# Patient Record
Sex: MALE | Race: WHITE | Employment: FULL TIME | URBAN - METROPOLITAN AREA
[De-identification: names, ages, dates, MRNs, and addresses within clinical notes are randomized per-mention and may not be internally consistent; named-entity substitution may affect disease eponyms.]

---

## 2018-04-19 ENCOUNTER — OFFICE VISIT (OUTPATIENT)
Dept: FAMILY MEDICINE CLINIC | Facility: CLINIC | Age: 43
End: 2018-04-19

## 2018-04-19 VITALS
HEART RATE: 100 BPM | WEIGHT: 245 LBS | TEMPERATURE: 98 F | RESPIRATION RATE: 16 BRPM | BODY MASS INDEX: 34.3 KG/M2 | SYSTOLIC BLOOD PRESSURE: 150 MMHG | HEIGHT: 71 IN | DIASTOLIC BLOOD PRESSURE: 88 MMHG

## 2018-04-19 DIAGNOSIS — L73.9 FOLLICULITIS: ICD-10-CM

## 2018-04-19 DIAGNOSIS — R03.0 ELEVATED BLOOD PRESSURE READING: ICD-10-CM

## 2018-04-19 DIAGNOSIS — N50.82 SCROTAL PAIN: Primary | ICD-10-CM

## 2018-04-19 PROCEDURE — 99203 OFFICE O/P NEW LOW 30 MIN: CPT | Performed by: FAMILY MEDICINE

## 2018-04-19 NOTE — PROGRESS NOTES
Ca Hoang is a 44year old male. Patient presents with:  Establish Care: possible MRSA on back, inflammation at vasectomy site-had done 2012 per pt      HPI:   He gets pimples on his back. They come and go.  Will get one as big as a nguyen that will  150/88   Pulse 100   Temp 98.4 °F (36.9 °C) (Temporal)   Resp 16   Ht 71\"   Wt 245 lb   BMI 34.17 kg/m²  Body mass index is 34.17 kg/m².       GENERAL: well developed, well nourished,in no apparent distress  SKIN: no rashes,no suspicious lesions, upper ese

## 2018-05-03 ENCOUNTER — TELEPHONE (OUTPATIENT)
Dept: FAMILY MEDICINE CLINIC | Facility: CLINIC | Age: 43
End: 2018-05-03

## 2018-05-03 NOTE — TELEPHONE ENCOUNTER
Pt is currently a pt of   But would like to switch to [facility]. He says it's bc [facility] see his wife.

## 2018-07-10 ENCOUNTER — TELEPHONE (OUTPATIENT)
Dept: FAMILY MEDICINE CLINIC | Facility: CLINIC | Age: 43
End: 2018-07-10

## 2018-07-10 ENCOUNTER — HOSPITAL ENCOUNTER (OUTPATIENT)
Dept: ULTRASOUND IMAGING | Age: 43
Discharge: HOME | End: 2018-07-10
Attending: FAMILY MEDICINE
Payer: COMMERCIAL

## 2018-07-10 DIAGNOSIS — N50.89 SCROTAL MASS: Primary | ICD-10-CM

## 2018-07-10 DIAGNOSIS — N50.82 SCROTAL PAIN: ICD-10-CM

## 2018-07-10 PROCEDURE — 93975 VASCULAR STUDY: CPT | Performed by: FAMILY MEDICINE

## 2018-07-10 PROCEDURE — 76870 US EXAM SCROTUM: CPT | Performed by: FAMILY MEDICINE

## 2018-07-10 NOTE — TELEPHONE ENCOUNTER
I called patient. and discussed US results with pt. I am not sure what to make of the mass, he does have hydrocele as well. Lets refer to Dr. Josue Oro for follow up.  These issues are chronic, so this is not an emergent referral.     RN: please fax US report

## 2018-07-10 NOTE — TELEPHONE ENCOUNTER
Spoke with the pt and advised of the notes from Dr. Deidra Baker and he v/u  He asks me what would be symptoms of prostate CA- I advised that he would have trouble urinating, be up multiple times through out the night and not able to urinate.  He might have pain wi

## 2018-07-10 NOTE — TELEPHONE ENCOUNTER
Pt would like to know if there is a blood test he can have done within the next 2 days that checks for prostate cancer. He leaves for work within the next 3 days for a month and would like to have it checked before.    Please return call to [phone]

## 2018-07-10 NOTE — TELEPHONE ENCOUNTER
Sorry, no, I've never met him, don't usually do prostate cancer test at 44, would need to discuss concerns/symptoms and then order testing as deemed appropriate

## 2018-08-30 ENCOUNTER — TELEPHONE (OUTPATIENT)
Dept: FAMILY MEDICINE CLINIC | Facility: CLINIC | Age: 43
End: 2018-08-30

## 2018-08-30 PROBLEM — L72.3 SCROTAL SEBACEOUS CYST: Status: ACTIVE | Noted: 2018-08-30

## 2018-08-30 NOTE — TELEPHONE ENCOUNTER
Pt called and asked for a copy of the ultra sound he recently had of his scrotum and a report to take to another dr today. Pt picked up disc and report and completed med records form. Made bar code to this note and sent med records form to scanning.

## 2019-01-23 ENCOUNTER — TELEPHONE (OUTPATIENT)
Dept: FAMILY MEDICINE CLINIC | Facility: CLINIC | Age: 44
End: 2019-01-23

## 2019-01-23 NOTE — TELEPHONE ENCOUNTER
Paperwork in blue book for the Department of [address] [place] [address]. Copy sent to scanning.

## 2019-02-13 ENCOUNTER — TELEPHONE (OUTPATIENT)
Dept: FAMILY MEDICINE CLINIC | Facility: CLINIC | Age: 44
End: 2019-02-13

## 2019-02-13 NOTE — TELEPHONE ENCOUNTER
Sure, can schedule CPX and if he has any test results from prior testing bring with him (if they did any labs)

## 2019-02-13 NOTE — TELEPHONE ENCOUNTER
Pt called, states he'd like a physical with fasting blood work but he [facility] past [facility] months and was wondering about fasting blood work. Please call pt at [phone] between noon and 1800 hours (6:00PM).   Okay

## 2019-02-13 NOTE — TELEPHONE ENCOUNTER
Spoke with the pt and scheduled his appt  Asked him to bring any labs he has had done - he v/u  Future Appointments   Date Time Provider Rosa Shrestha   3/8/2019  1:15 PM Birdie Leahy MD [facility]

## 2019-03-08 ENCOUNTER — OFFICE VISIT (OUTPATIENT)
Dept: FAMILY MEDICINE CLINIC | Facility: CLINIC | Age: 44
End: 2019-03-08
Payer: COMMERCIAL

## 2019-03-08 VITALS
BODY MASS INDEX: 37.52 KG/M2 | HEIGHT: 71 IN | TEMPERATURE: 98 F | OXYGEN SATURATION: 98 % | HEART RATE: 76 BPM | DIASTOLIC BLOOD PRESSURE: 80 MMHG | SYSTOLIC BLOOD PRESSURE: 118 MMHG | WEIGHT: 268 LBS

## 2019-03-08 DIAGNOSIS — Z13.29 THYROID DISORDER SCREEN: ICD-10-CM

## 2019-03-08 DIAGNOSIS — Z23 NEED FOR VACCINATION: ICD-10-CM

## 2019-03-08 DIAGNOSIS — Z13.220 LIPID SCREENING: ICD-10-CM

## 2019-03-08 DIAGNOSIS — M25.561 CHRONIC PAIN OF RIGHT KNEE: ICD-10-CM

## 2019-03-08 DIAGNOSIS — Z00.00 ROUTINE HISTORY AND PHYSICAL EXAMINATION OF ADULT: Primary | ICD-10-CM

## 2019-03-08 DIAGNOSIS — Z13.0 SCREENING, ANEMIA, DEFICIENCY, IRON: ICD-10-CM

## 2019-03-08 DIAGNOSIS — L05.91 PILONIDAL CYST: ICD-10-CM

## 2019-03-08 DIAGNOSIS — K42.9 UMBILICAL HERNIA WITHOUT OBSTRUCTION AND WITHOUT GANGRENE: ICD-10-CM

## 2019-03-08 DIAGNOSIS — G89.29 CHRONIC PAIN OF RIGHT KNEE: ICD-10-CM

## 2019-03-08 DIAGNOSIS — Z13.1 DIABETES MELLITUS SCREENING: ICD-10-CM

## 2019-03-08 PROBLEM — R03.0 ELEVATED BLOOD PRESSURE READING: Status: RESOLVED | Noted: 2018-04-19 | Resolved: 2019-03-08

## 2019-03-08 LAB
ALBUMIN SERPL-MCNC: 3.9 G/DL (ref 3.4–5)
ALBUMIN/GLOB SERPL: 1.1 {RATIO} (ref 1–2)
ALP LIVER SERPL-CCNC: 82 U/L (ref 45–117)
ALT SERPL-CCNC: 41 U/L (ref 16–61)
ANION GAP SERPL CALC-SCNC: 6 MMOL/L (ref 0–18)
AST SERPL-CCNC: 20 U/L (ref 15–37)
BASOPHILS # BLD AUTO: 0.05 X10(3) UL (ref 0–0.2)
BASOPHILS NFR BLD AUTO: 0.7 %
BILIRUB SERPL-MCNC: 0.2 MG/DL (ref 0.1–2)
BUN BLD-MCNC: 12 MG/DL (ref 7–18)
BUN/CREAT SERPL: 15 (ref 10–20)
CALCIUM BLD-MCNC: 9.6 MG/DL (ref 8.5–10.1)
CHLORIDE SERPL-SCNC: 107 MMOL/L (ref 98–107)
CHOLEST SMN-MCNC: 168 MG/DL (ref ?–200)
CO2 SERPL-SCNC: 26 MMOL/L (ref 21–32)
CREAT BLD-MCNC: 0.8 MG/DL (ref 0.7–1.3)
DEPRECATED RDW RBC AUTO: 42.4 FL (ref 35.1–46.3)
EOSINOPHIL # BLD AUTO: 0.19 X10(3) UL (ref 0–0.7)
EOSINOPHIL NFR BLD AUTO: 2.8 %
ERYTHROCYTE [DISTWIDTH] IN BLOOD BY AUTOMATED COUNT: 13.1 % (ref 11–15)
GLOBULIN PLAS-MCNC: 3.7 G/DL (ref 2.8–4.4)
GLUCOSE BLD-MCNC: 90 MG/DL (ref 70–99)
HCT VFR BLD AUTO: 49 % (ref 39–53)
HDLC SERPL-MCNC: 33 MG/DL (ref 40–59)
HGB BLD-MCNC: 16.8 G/DL (ref 13–17.5)
IMM GRANULOCYTES # BLD AUTO: 0.01 X10(3) UL (ref 0–1)
IMM GRANULOCYTES NFR BLD: 0.1 %
LDLC SERPL CALC-MCNC: 98 MG/DL (ref ?–100)
LYMPHOCYTES # BLD AUTO: 2.57 X10(3) UL (ref 1–4)
LYMPHOCYTES NFR BLD AUTO: 38.2 %
M PROTEIN MFR SERPL ELPH: 7.6 G/DL (ref 6.4–8.2)
MCH RBC QN AUTO: 30.4 PG (ref 26–34)
MCHC RBC AUTO-ENTMCNC: 34.3 G/DL (ref 31–37)
MCV RBC AUTO: 88.6 FL (ref 80–100)
MONOCYTES # BLD AUTO: 0.65 X10(3) UL (ref 0.1–1)
MONOCYTES NFR BLD AUTO: 9.7 %
NEUTROPHILS # BLD AUTO: 3.25 X10 (3) UL (ref 1.5–7.7)
NEUTROPHILS # BLD AUTO: 3.25 X10(3) UL (ref 1.5–7.7)
NEUTROPHILS NFR BLD AUTO: 48.5 %
NONHDLC SERPL-MCNC: 135 MG/DL (ref ?–130)
OSMOLALITY SERPL CALC.SUM OF ELEC: 287 MOSM/KG (ref 275–295)
PLATELET # BLD AUTO: 228 10(3)UL (ref 150–450)
POTASSIUM SERPL-SCNC: 4.4 MMOL/L (ref 3.5–5.1)
RBC # BLD AUTO: 5.53 X10(6)UL (ref 4.3–5.7)
SODIUM SERPL-SCNC: 139 MMOL/L (ref 136–145)
TRIGL SERPL-MCNC: 185 MG/DL (ref 30–149)
TSI SER-ACNC: 0.78 MIU/ML (ref 0.36–3.74)
VLDLC SERPL CALC-MCNC: 37 MG/DL (ref 0–30)
WBC # BLD AUTO: 6.7 X10(3) UL (ref 4–11)

## 2019-03-08 PROCEDURE — 83036 HEMOGLOBIN GLYCOSYLATED A1C: CPT | Performed by: FAMILY MEDICINE

## 2019-03-08 PROCEDURE — 90715 TDAP VACCINE 7 YRS/> IM: CPT | Performed by: FAMILY MEDICINE

## 2019-03-08 PROCEDURE — 99396 PREV VISIT EST AGE 40-64: CPT | Performed by: FAMILY MEDICINE

## 2019-03-08 PROCEDURE — 90472 IMMUNIZATION ADMIN EACH ADD: CPT | Performed by: FAMILY MEDICINE

## 2019-03-08 PROCEDURE — 90686 IIV4 VACC NO PRSV 0.5 ML IM: CPT | Performed by: FAMILY MEDICINE

## 2019-03-08 PROCEDURE — 36415 COLL VENOUS BLD VENIPUNCTURE: CPT | Performed by: FAMILY MEDICINE

## 2019-03-08 PROCEDURE — 80050 GENERAL HEALTH PANEL: CPT | Performed by: FAMILY MEDICINE

## 2019-03-08 PROCEDURE — 80061 LIPID PANEL: CPT | Performed by: FAMILY MEDICINE

## 2019-03-08 PROCEDURE — 90471 IMMUNIZATION ADMIN: CPT | Performed by: FAMILY MEDICINE
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
## 2019-03-09 LAB
EST. AVERAGE GLUCOSE BLD GHB EST-MCNC: 108 MG/DL (ref 68–126)
HBA1C MFR BLD HPLC: 5.4 % (ref ?–5.7)

## 2019-03-29 ENCOUNTER — TELEPHONE (OUTPATIENT)
Dept: FAMILY MEDICINE CLINIC | Facility: CLINIC | Age: 44
End: 2019-03-29

## 2019-03-29 RX ORDER — VARENICLINE TARTRATE 1 MG/1
1 TABLET, FILM COATED ORAL 2 TIMES DAILY
Qty: 1 PACKAGE | Refills: 4 | Status: ON HOLD | OUTPATIENT
Start: 2019-03-29 | End: 2019-05-30

## 2019-03-29 NOTE — TELEPHONE ENCOUNTER
I'm fie with that, agree with advice given, [place] has some free services to help as well:  Http://quityes. org/  1-866-QUIT-YES

## 2019-03-29 NOTE — TELEPHONE ENCOUNTER
Spoke with the pt and advised of the medication and the pharmcy and the options from the [address]  He v/u  He will call if any questions

## 2019-03-29 NOTE — TELEPHONE ENCOUNTER
Patient would like to speak with Dr Doris Duarte regarding Chantix. Is thinking about trying Chantix to quit smoking and has some questions regarding this medication.

## 2019-03-29 NOTE — TELEPHONE ENCOUNTER
Spoke with the pt and he is wanting to quit smoking- he is currently wearing a nicotine patch 21mg- he states that he is not smoking cigarettes but is smoking cigars- He is interested in trying the chantix- he states that the chantix is not allowed in his

## 2019-05-30 PROBLEM — F32.2 MAJOR DEPRESSIVE DISORDER, SINGLE EPISODE, SEVERE (HCC): Status: ACTIVE | Noted: 2019-05-30

## 2019-05-31 PROBLEM — F10.20 ALCOHOL USE DISORDER, SEVERE, DEPENDENCE (HCC): Status: ACTIVE | Noted: 2019-05-31

## 2019-06-11 ENCOUNTER — OFFICE VISIT (OUTPATIENT)
Dept: FAMILY MEDICINE CLINIC | Facility: CLINIC | Age: 44
End: 2019-06-11
Payer: COMMERCIAL

## 2019-06-11 VITALS
RESPIRATION RATE: 14 BRPM | BODY MASS INDEX: 37.43 KG/M2 | HEIGHT: 71 IN | TEMPERATURE: 98 F | SYSTOLIC BLOOD PRESSURE: 150 MMHG | DIASTOLIC BLOOD PRESSURE: 90 MMHG | HEART RATE: 88 BPM | WEIGHT: 267.38 LBS

## 2019-06-11 DIAGNOSIS — M79.604 PAIN OF RIGHT LOWER EXTREMITY: Primary | ICD-10-CM

## 2019-06-11 PROCEDURE — 99213 OFFICE O/P EST LOW 20 MIN: CPT | Performed by: FAMILY MEDICINE

## 2019-06-11 RX ORDER — NALTREXONE HYDROCHLORIDE 50 MG/1
50 TABLET, FILM COATED ORAL DAILY
COMMUNITY
End: 2020-06-01 | Stop reason: ALTCHOICE

## 2019-06-11 NOTE — PROGRESS NOTES
Tresa Cronin is a 38year old male. HPI:   Patient c/o ongoign r knee pain for over 6months, becoming more noticeable and frequent.   He works out regularly and it doesn't seem to bothers him when working out or bearing weight at all, just when lifting l SYSTEMS:   GENERAL HEALTH: feels well otherwise  SKIN: denies any unusual skin lesions or rashes  CARDIOVASCULAR: good color and temp to extremities  NEURO: denies numbness/tingling  MUSC: see HPI    EXAM:   /90   Pulse 88   Temp 98.2 °F (36.8 °C) (T

## 2019-06-18 ENCOUNTER — TELEPHONE (OUTPATIENT)
Dept: FAMILY MEDICINE CLINIC | Facility: CLINIC | Age: 44
End: 2019-06-18

## 2019-06-18 NOTE — TELEPHONE ENCOUNTER
Bunny Corbett from the [facility] department called to ask about the order. The order says (w) and (w/o) for right knee. Is that what [facility] really wants? They can do (w) but they normally don't.

## 2019-06-20 ENCOUNTER — HOSPITAL ENCOUNTER (OUTPATIENT)
Dept: MRI IMAGING | Age: 44
Discharge: HOME | End: 2019-06-20
Attending: FAMILY MEDICINE
Payer: COMMERCIAL

## 2019-06-20 DIAGNOSIS — M79.604 PAIN OF RIGHT LOWER EXTREMITY: ICD-10-CM

## 2019-06-20 PROCEDURE — A9575 INJ GADOTERATE MEGLUMI 0.1ML: HCPCS | Performed by: FAMILY MEDICINE

## 2019-06-20 PROCEDURE — 73723 MRI JOINT LWR EXTR W/O&W/DYE: CPT | Performed by: FAMILY MEDICINE

## 2019-06-24 ENCOUNTER — TELEPHONE (OUTPATIENT)
Dept: FAMILY MEDICINE CLINIC | Facility: CLINIC | Age: 44
End: 2019-06-24

## 2019-06-24 ENCOUNTER — PATIENT MESSAGE (OUTPATIENT)
Dept: FAMILY MEDICINE CLINIC | Facility: CLINIC | Age: 44
End: 2019-06-24

## 2019-06-24 NOTE — TELEPHONE ENCOUNTER
From: Nicci Diamond  To: Tatyana Ocampo MD  Sent: 6/24/2019 6:22 AM CDT  Subject: Test Results Question    Dr Ginger Simmons, I was curious if you had my mri results back? This is the craziest thing with this knee I tell ya.  I was squatting at the gym with heavy we

## 2019-06-24 NOTE — TELEPHONE ENCOUNTER
----- Message from Amanda Rosa MD sent at 6/24/2019  7:02 AM CDT -----  Please notify patient it turns out he has a tear of the meniscus that sits adjacent to the medial (inner) border of the knee cap.   This is likely the cause of all or the majority of

## 2019-06-24 NOTE — TELEPHONE ENCOUNTER
Patient advised. Verbalized understanding. Patient states he also sent a [employer] message this morning to Dr Ashleigh Steinberg. Wants to know if he can still exercise. States that when he does squats it doesn't hurt, but when he runs/walks it hurts.  Ok to send [address]

## 2019-06-24 NOTE — TELEPHONE ENCOUNTER
Pt called, saw the MRI results on [facility] and would like a call back to discuss.    Please call pt at [phone]

## 2019-06-24 NOTE — TELEPHONE ENCOUNTER
My rule of thumb here: if it hurst don't do it. If it doesn't hurt (or feel unstable, like knee will give out), go ahead.

## 2019-06-25 ENCOUNTER — MED REC SCAN ONLY (OUTPATIENT)
Dept: FAMILY MEDICINE CLINIC | Facility: CLINIC | Age: 44
End: 2019-06-25

## 2019-07-18 ENCOUNTER — TELEPHONE (OUTPATIENT)
Dept: FAMILY MEDICINE CLINIC | Facility: CLINIC | Age: 44
End: 2019-07-18

## 2019-07-18 NOTE — TELEPHONE ENCOUNTER
Patient is having some issues with his left shoulder. He has been \"hitting the gym real hard\" not a major issue but having some pain. He won't be back in town (works out of town) until 7/25. He is aware Dr. Sylvia Baker will be out on the 25th and 26th.  Anyway s

## 2019-07-29 ENCOUNTER — OFFICE VISIT (OUTPATIENT)
Dept: FAMILY MEDICINE CLINIC | Facility: CLINIC | Age: 44
End: 2019-07-29
Payer: COMMERCIAL

## 2019-07-29 VITALS
DIASTOLIC BLOOD PRESSURE: 70 MMHG | OXYGEN SATURATION: 95 % | WEIGHT: 253 LBS | BODY MASS INDEX: 35 KG/M2 | TEMPERATURE: 99 F | RESPIRATION RATE: 18 BRPM | HEART RATE: 85 BPM | SYSTOLIC BLOOD PRESSURE: 100 MMHG

## 2019-07-29 DIAGNOSIS — M25.512 LEFT SHOULDER PAIN, UNSPECIFIED CHRONICITY: Primary | ICD-10-CM

## 2019-07-29 DIAGNOSIS — R29.898 SHOULDER CLICKING: ICD-10-CM

## 2019-07-29 PROCEDURE — 99213 OFFICE O/P EST LOW 20 MIN: CPT | Performed by: FAMILY MEDICINE

## 2019-07-29 NOTE — PROGRESS NOTES
Lydia Howard is a 38year old male. HPI:   Patient reports ongoing intermittent L shoulder pain and clicking/popping.     He continues to workout, lift weights, and is lifting heavy and it doesn't hurt while working out except occasionally mildly so with 5/29/2019. couple bottles of wine; sometimes beer 12-14 bottles; tina mills 1/2 bottle    Drug use: Yes      Frequency: 1.0 times per week      Types: Cocaine      Comment: pt reports he snorted some cocaine in [facility] while on vacation.           REVIEW

## 2019-08-20 ENCOUNTER — TELEPHONE (OUTPATIENT)
Dept: FAMILY MEDICINE CLINIC | Facility: CLINIC | Age: 44
End: 2019-08-20

## 2019-08-20 RX ORDER — VARENICLINE TARTRATE 1 MG/1
1 TABLET, FILM COATED ORAL 2 TIMES DAILY
Qty: 180 TABLET | Refills: 0 | Status: SHIPPED | OUTPATIENT
Start: 2019-08-20 | End: 2019-10-23

## 2019-08-20 NOTE — TELEPHONE ENCOUNTER
PT CALLED AND WOULD LIKE A CALL BACK FROM NURSE. HE HAS A FEW QUESTIONS ABOUT CHANTIX.     [address]

## 2019-08-20 NOTE — TELEPHONE ENCOUNTER
Spoke with the pt and he works on a [employer] and he has run out and quit smoking, but he ran out of medication and started smoking again  He wants to get at least 60 day supply    Looked up and the pill can be split in half-spoke with Dr. Polo Simmons about

## 2019-09-11 ENCOUNTER — TELEPHONE (OUTPATIENT)
Dept: FAMILY MEDICINE CLINIC | Facility: CLINIC | Age: 44
End: 2019-09-11

## 2019-09-11 NOTE — TELEPHONE ENCOUNTER
Spoke with the pt and advised of the notes from Dr. Jordan Dears he wonders if there is any withdrawal from the chantix?     He states we can send him a [employer] message with the response

## 2019-09-11 NOTE — TELEPHONE ENCOUNTER
That's aweseome.  All I can tell him is studies show staying on it another 12weeks significantly increased liklihood of staying smoke free in the studies done, he can do what he wants with that information

## 2019-09-11 NOTE — TELEPHONE ENCOUNTER
Pt would like to let [facility] know that the script for Chantix worked. He hasn't have a smoke in a week. He does want to know if he is able to stop taking it now or should he keep going for 12 weeks.    He does feel mentally that he wont start smoking ag

## 2019-09-30 ENCOUNTER — TELEPHONE (OUTPATIENT)
Dept: FAMILY MEDICINE CLINIC | Facility: CLINIC | Age: 44
End: 2019-09-30

## 2019-09-30 NOTE — TELEPHONE ENCOUNTER
Patient was in a few months ago and seen Dr Kalyn Townsend for a torn Meniscus and she referred him to the Orthopaedic MD. He did see the Ortho MD and he said it was a small tear and gave him a shot of Cortisone. It is still bothering him.  Should he come back and se

## 2019-09-30 NOTE — TELEPHONE ENCOUNTER
Spoke with the pt and he states that he can bench fine without pain, but as soon as he tried to run he had instant pain  Advised to go back and see the ortho that saw him before- he v/u

## 2019-10-23 ENCOUNTER — TELEPHONE (OUTPATIENT)
Dept: FAMILY MEDICINE CLINIC | Facility: CLINIC | Age: 44
End: 2019-10-23

## 2019-10-23 RX ORDER — VARENICLINE TARTRATE 1 MG/1
1 TABLET, FILM COATED ORAL 2 TIMES DAILY
Qty: 180 TABLET | Refills: 0 | Status: SHIPPED | OUTPATIENT
Start: 2019-10-23 | End: 2019-10-25

## 2019-10-23 NOTE — TELEPHONE ENCOUNTER
Varenicline Tartrate (CHANTIX CONTINUING MONTH GLORIA) 1 MG Oral Tab    Pt is going out of town for work for 3 week. He would like enough for that.    [id number] [phone], [phone], [phone]

## 2019-10-24 NOTE — TELEPHONE ENCOUNTER
Pt called, he did not want a whole prescription for 90 days for Chantix, insurance is not paying for a whole script. Pt only wants about 2-3 weeks worth of pills.   Pt would like us to call and cancel the script from yesterday and can we call in 2-3 weeks

## 2019-10-25 RX ORDER — VARENICLINE TARTRATE 1 MG/1
1 TABLET, FILM COATED ORAL 2 TIMES DAILY
Qty: 42 TABLET | Refills: 0 | Status: SHIPPED | OUTPATIENT
Start: 2019-10-25 | End: 2019-11-22

## 2019-11-22 NOTE — TELEPHONE ENCOUNTER
LRF 10/25/19 #[id number] [phone] rf    LOV  7/29/19    I asked the pt how things were going with his smoking- he tells met that he slipped and did smoke- that is why he is requesting the chantix    I asked how many he wants this time as last time he was very specific on

## 2019-11-23 ENCOUNTER — MED REC SCAN ONLY (OUTPATIENT)
Dept: FAMILY MEDICINE CLINIC | Facility: CLINIC | Age: 44
End: 2019-11-23

## 2019-11-24 RX ORDER — VARENICLINE TARTRATE 1 MG/1
1 TABLET, FILM COATED ORAL 2 TIMES DAILY
Qty: 60 TABLET | Refills: 3 | Status: SHIPPED | OUTPATIENT
Start: 2019-11-24 | End: 2020-06-01 | Stop reason: ALTCHOICE

## 2020-01-30 ENCOUNTER — TELEPHONE (OUTPATIENT)
Dept: FAMILY MEDICINE CLINIC | Facility: CLINIC | Age: 45
End: 2020-01-30

## 2020-01-30 NOTE — TELEPHONE ENCOUNTER
Patient sent a [employer] message with some images. He would like someone to look at them and call him back. 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 Please call back.

## 2020-01-31 ENCOUNTER — TELEPHONE (OUTPATIENT)
Dept: FAMILY MEDICINE CLINIC | Facility: CLINIC | Age: 45
End: 2020-01-31

## 2020-01-31 NOTE — TELEPHONE ENCOUNTER
Pt called back he has questions he asks if Dr. Jhonathan Porter saw the pictures from the [facility] and if she knew what it was. Advised that I do not know- that the message was written by Dr. Jhonathan Porter, but that she would not usually make a diagnosis from a picture.  He v

## 2020-01-31 NOTE — TELEPHONE ENCOUNTER
Left message for the pt to look at his mychart for Dr. Toby Veras response    Advised to call back if more questions

## 2020-02-06 ENCOUNTER — TELEPHONE (OUTPATIENT)
Dept: PAIN CLINIC | Facility: CLINIC | Age: 45
End: 2020-02-06

## 2020-02-06 NOTE — TELEPHONE ENCOUNTER
Patient referred to pain management by Dr. Marcos Ash, spouse is previous employee. Patient would be new to Pain Management dept, please schedule first available with Dr. Marcos Ash.

## 2020-02-07 NOTE — TELEPHONE ENCOUNTER
Spoke with patient & he will be out of town when first avail appt & wants to call back to schedule when he knows his schedule.

## 2020-03-17 RX ORDER — NICOTINE 21 MG/24HR
1 PATCH, TRANSDERMAL 24 HOURS TRANSDERMAL EVERY 24 HOURS
Qty: 21 PATCH | Refills: 0 | Status: SHIPPED | OUTPATIENT
Start: 2020-03-17 | End: 2020-06-01 | Stop reason: ALTCHOICE

## 2020-03-17 RX ORDER — NICOTINE 21 MG/24HR
1 PATCH, TRANSDERMAL 24 HOURS TRANSDERMAL EVERY 24 HOURS
Qty: 14 PATCH | Refills: 0 | Status: SHIPPED | OUTPATIENT
Start: 2020-03-17 | End: 2020-06-01 | Stop reason: ALTCHOICE

## 2020-03-17 NOTE — TELEPHONE ENCOUNTER
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

## 2020-03-17 NOTE — TELEPHONE ENCOUNTER
We have not filled this for the pt before  LOV  7/29/19    Spoke with the pt and he states that he previously bought the patches online, but his insurance will pay for them  He states that he wants both the 1st and 2nd step sent to the pharmacy- he wiil be

## 2020-04-09 ENCOUNTER — TELEPHONE (OUTPATIENT)
Dept: FAMILY MEDICINE CLINIC | Facility: CLINIC | Age: 45
End: 2020-04-09

## 2020-04-09 NOTE — TELEPHONE ENCOUNTER
I sent script for the nicotine patch.     Fine to start with my chart message with details (when did it start, when has it drained, what did drainage look like, any injury to the area, any prior surgery on the area) and picture thanks

## 2020-04-09 NOTE — TELEPHONE ENCOUNTER
Patient states is started 2 weeks ago. Has had blood and clear fluid in his boxer shorts. No injury or prior surgery to the area.   Has sent picture via ividence

## 2020-04-09 NOTE — TELEPHONE ENCOUNTER
He is asking for step 3 of the nicotine patch   [facility] [email or web address] [facility]   he also has a boil on his leg  That is getting worse he thinks he needs to have it looked at it fills up then drains

## 2020-06-01 ENCOUNTER — OFFICE VISIT (OUTPATIENT)
Dept: FAMILY MEDICINE CLINIC | Facility: CLINIC | Age: 45
End: 2020-06-01
Payer: COMMERCIAL

## 2020-06-01 VITALS
RESPIRATION RATE: 16 BRPM | SYSTOLIC BLOOD PRESSURE: 130 MMHG | DIASTOLIC BLOOD PRESSURE: 86 MMHG | BODY MASS INDEX: 39.06 KG/M2 | HEART RATE: 96 BPM | TEMPERATURE: 100 F | HEIGHT: 71 IN | WEIGHT: 279 LBS

## 2020-06-01 DIAGNOSIS — L02.92 BOIL: Primary | ICD-10-CM

## 2020-06-01 DIAGNOSIS — Z78.9 TAKES DIETARY SUPPLEMENTS: ICD-10-CM

## 2020-06-01 DIAGNOSIS — Z13.220 LIPID SCREENING: ICD-10-CM

## 2020-06-01 DIAGNOSIS — Z72.0 CURRENT NICOTINE USE: ICD-10-CM

## 2020-06-01 DIAGNOSIS — Z13.0 SCREENING, ANEMIA, DEFICIENCY, IRON: ICD-10-CM

## 2020-06-01 DIAGNOSIS — Z13.1 DIABETES MELLITUS SCREENING: ICD-10-CM

## 2020-06-01 PROCEDURE — 85025 COMPLETE CBC W/AUTO DIFF WBC: CPT | Performed by: FAMILY MEDICINE

## 2020-06-01 PROCEDURE — 83036 HEMOGLOBIN GLYCOSYLATED A1C: CPT | Performed by: FAMILY MEDICINE

## 2020-06-01 PROCEDURE — 80053 COMPREHEN METABOLIC PANEL: CPT | Performed by: FAMILY MEDICINE

## 2020-06-01 PROCEDURE — 80061 LIPID PANEL: CPT | Performed by: FAMILY MEDICINE

## 2020-06-01 PROCEDURE — 99214 OFFICE O/P EST MOD 30 MIN: CPT | Performed by: FAMILY MEDICINE

## 2020-06-01 PROCEDURE — 36415 COLL VENOUS BLD VENIPUNCTURE: CPT | Performed by: FAMILY MEDICINE

## 2020-06-01 RX ORDER — THIAMINE HCL 100 MG
TABLET ORAL
COMMUNITY
End: 2021-04-06

## 2020-06-01 RX ORDER — CHLORAL HYDRATE 500 MG
1000 CAPSULE ORAL DAILY
COMMUNITY

## 2020-06-01 NOTE — PROGRESS NOTES
Rui Cervantes is a 41year old male. HPI:   Patient here to discuss several things:    1) he wonders about prostate screening now.   No family history of prostate d/o or cancer, he has no changes in urinary stream nor erection, just thought it was time to use: Yes      Frequency: Never      Comment: Last use:  5/29/2019.   couple bottles of wine; sometimes beer 12-14 bottles; tina mills 1/2 bottle    Drug use: Yes      Frequency: 1.0 times per week      Types: Cocaine      Comment: pt reports he snorted so exposure with infrequent vaping now (no cigarettes or cigars anymore)  We spent much of visit discsusing prostate cancer screening as well and advised against it at this time       The patient indicates understanding of these issues and agrees to the plan.

## 2020-06-02 ENCOUNTER — OFFICE VISIT (OUTPATIENT)
Dept: SURGERY | Facility: CLINIC | Age: 45
End: 2020-06-02
Payer: COMMERCIAL

## 2020-06-02 VITALS — HEIGHT: 72 IN | WEIGHT: 279 LBS | BODY MASS INDEX: 37.79 KG/M2 | TEMPERATURE: 99 F

## 2020-06-02 DIAGNOSIS — L73.2 HIDRADENITIS: Primary | ICD-10-CM

## 2020-06-02 PROCEDURE — 99244 OFF/OP CNSLTJ NEW/EST MOD 40: CPT | Performed by: SURGERY

## 2020-06-02 RX ORDER — DOXYCYCLINE 100 MG/1
100 CAPSULE ORAL DAILY
Qty: 30 CAPSULE | Refills: 0 | Status: SHIPPED | OUTPATIENT
Start: 2020-06-02 | End: 2020-07-02

## 2020-06-02 RX ORDER — CHLORHEXIDINE GLUCONATE 4 G/100ML
10 SOLUTION TOPICAL
Qty: 1 BOTTLE | Refills: 3 | Status: SHIPPED | OUTPATIENT
Start: 2020-06-02 | End: 2020-10-27

## 2020-06-02 NOTE — H&P
Ca Hoang is a 41year old male  Patient presents with:  Abscess: New patient referred by Dr. Lisandra Naranjo for abscess left inner thigh. abscess not draining. pt has had it for a couple years.        REFERRED BY    Patient presents with 5-year history of recur cigarettes    Alcohol use: Yes      Frequency: Never      Comment: Last use:  5/29/2019.   couple bottles of wine; sometimes beer 12-14 bottles; tina mills 1/2 bottle    Drug use: Yes      Frequency: 1.0 times per week      Types: Cocaine      Comment: pt hidradenitis    STUDIES:     Office Visit on 06/01/2020   Component Date Value Ref Range Status   • Glucose 06/01/2020 77  70 - 99 mg/dL Final   • Sodium 06/01/2020 136  136 - 145 mmol/L Final   • Potassium 06/01/2020 4.2  3.5 - 5.1 mmol/L Final   • Chlori Interpretive Information:   An HDL cholesterol <40 mg/dL is low and constitutes a coronary heart disease risk factor. An HDL cholesterol >60 mg/dL is a negative risk factor for coronary heart disease.        • Triglycerides 06/01/2020 87  30 - 149 mg/dL [employer] 06/01/2020 0.7  % Final   • Immature Granulocyte % 06/01/2020 0.2  % Final       ASSESSMENT   Imp: Hidradenitis left upper medial thigh  PLan: Doxycycline x1 month plus Hibiclens if no improvement then patient will return for excision      Meds & Refills f

## 2020-07-01 ENCOUNTER — OFFICE VISIT (OUTPATIENT)
Dept: SURGERY | Facility: CLINIC | Age: 45
End: 2020-07-01
Payer: COMMERCIAL

## 2020-07-01 VITALS — WEIGHT: 279 LBS | TEMPERATURE: 97 F | BODY MASS INDEX: 37.79 KG/M2 | HEIGHT: 72 IN

## 2020-07-01 DIAGNOSIS — L73.2 HYDRADENITIS: Primary | ICD-10-CM

## 2020-07-01 PROCEDURE — 99212 OFFICE O/P EST SF 10 MIN: CPT | Performed by: PHYSICIAN ASSISTANT

## 2020-07-01 NOTE — PROGRESS NOTES
Office Visit Note       Active Problems      1. Hydradenitis          Chief Complaint   Patient presents with:  Wound: EST PT recheck boil left inner groin. PT denies any issues or concerns.         History of Present Illness  Curt Griffin is a 41 year ol Drug use: Yes      Frequency: 1.0 times per week      Types: Cocaine      Comment: pt reports he snorted some cocaine in [facility] while on vacation. Doxycycline Monohydrate (MONODOX) 100 MG Oral Cap, Take 1 capsule (100 mg total) by mouth daily.   Chlorh rate and regular rhythm. Pulmonary/Chest: Effort normal and breath sounds normal.   Abdominal: Soft. Bowel sounds are normal. He exhibits no distension. There is no tenderness. There is no rebound.    Neurological: He is alert and oriented to person, plac

## 2020-07-07 ENCOUNTER — TELEPHONE (OUTPATIENT)
Dept: SURGERY | Facility: CLINIC | Age: 45
End: 2020-07-07

## 2020-07-07 DIAGNOSIS — L73.2 HIDRADENITIS: Primary | ICD-10-CM

## 2020-07-20 ENCOUNTER — TELEPHONE (OUTPATIENT)
Dept: SURGERY | Facility: CLINIC | Age: 45
End: 2020-07-20

## 2020-07-20 DIAGNOSIS — L73.2 HIDRADENITIS: Primary | ICD-10-CM

## 2020-08-12 ENCOUNTER — TELEPHONE (OUTPATIENT)
Dept: SURGERY | Facility: CLINIC | Age: 45
End: 2020-08-12

## 2020-08-12 DIAGNOSIS — L73.2 HIDRADENITIS: Primary | ICD-10-CM

## 2020-09-03 ENCOUNTER — PATIENT MESSAGE (OUTPATIENT)
Dept: FAMILY MEDICINE CLINIC | Facility: CLINIC | Age: 45
End: 2020-09-03

## 2020-09-04 NOTE — TELEPHONE ENCOUNTER
Left message on patient's voice mail with the appointment time. Asked patient to call the office to confirm appointment.

## 2020-09-04 NOTE — TELEPHONE ENCOUNTER
From: Ca Hoang  To: Savannah Matthews MD  Sent: 9/3/2020 10:06 PM CDT  Subject: Other    I am going to be in town for a surgery in September. I'd like to see if you could take care of my physical while I'm there on either the 17th or 18th?  I feel like th

## 2020-09-18 ENCOUNTER — APPOINTMENT (OUTPATIENT)
Dept: LAB | Age: 45
End: 2020-09-18
Attending: SURGERY
Payer: COMMERCIAL

## 2020-09-18 ENCOUNTER — OFFICE VISIT (OUTPATIENT)
Dept: FAMILY MEDICINE CLINIC | Facility: CLINIC | Age: 45
End: 2020-09-18
Payer: COMMERCIAL

## 2020-09-18 VITALS
TEMPERATURE: 99 F | OXYGEN SATURATION: 98 % | HEIGHT: 71 IN | DIASTOLIC BLOOD PRESSURE: 78 MMHG | BODY MASS INDEX: 40.04 KG/M2 | HEART RATE: 98 BPM | RESPIRATION RATE: 18 BRPM | WEIGHT: 286 LBS | SYSTOLIC BLOOD PRESSURE: 142 MMHG

## 2020-09-18 DIAGNOSIS — Z20.822 SUSPECTED COVID-19 VIRUS INFECTION: ICD-10-CM

## 2020-09-18 DIAGNOSIS — Z01.818 PRE-OP TESTING: ICD-10-CM

## 2020-09-18 DIAGNOSIS — Z00.00 ROUTINE GENERAL MEDICAL EXAMINATION AT A HEALTH CARE FACILITY: Primary | ICD-10-CM

## 2020-09-18 DIAGNOSIS — Z23 NEED FOR VACCINATION: ICD-10-CM

## 2020-09-18 LAB — SARS-COV-2 IGG SERPLBLD QL IA.RAPID: NEGATIVE

## 2020-09-18 PROCEDURE — 99396 PREV VISIT EST AGE 40-64: CPT | Performed by: FAMILY MEDICINE

## 2020-09-18 PROCEDURE — 90686 IIV4 VACC NO PRSV 0.5 ML IM: CPT | Performed by: FAMILY MEDICINE

## 2020-09-18 PROCEDURE — 86769 SARS-COV-2 COVID-19 ANTIBODY: CPT | Performed by: FAMILY MEDICINE

## 2020-09-18 PROCEDURE — 90471 IMMUNIZATION ADMIN: CPT | Performed by: FAMILY MEDICINE

## 2020-09-18 PROCEDURE — 3008F BODY MASS INDEX DOCD: CPT | Performed by: FAMILY MEDICINE

## 2020-09-18 PROCEDURE — 3077F SYST BP >= 140 MM HG: CPT | Performed by: FAMILY MEDICINE

## 2020-09-18 PROCEDURE — 3078F DIAST BP <80 MM HG: CPT | Performed by: FAMILY MEDICINE

## 2020-09-18 NOTE — PROGRESS NOTES
Tresa Sanchez is a 41year old male who presents for a complete physical exam.   HPI:   Pt complains of nothing,feeling well. 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   He drank a large coffee be Take 1 tablet by mouth daily. • omega-3 fatty acids 1000 MG Oral Cap Take 1,000 mg by mouth daily. • Magnesium 500 MG Oral Tab Take by mouth. • B Complex-C-Folic Acid Oral Tab Take by mouth. No past medical history on file.    Past Surgic bleeding      EXAM:   /78   Pulse 98   Temp 98.7 °F (37.1 °C) (Temporal)   Resp 18   Ht 71\"   Wt 286 lb (129.7 kg)   SpO2 98%   BMI 39.89 kg/m²   GENERAL: well developed, well nourished,in no apparent distress  SKIN: no rashes,no suspicious lesions let me know if averaging >140sbp and/or 90dbp; I suspect white coat + caffeine rasied now; he should check after caffeine outside of here as well    The patient indicates understanding of these issues and agrees to the plan.   The patient is asked to return

## 2020-09-19 LAB — SARS-COV-2 RNA RESP QL NAA+PROBE: NOT DETECTED

## 2020-09-21 ENCOUNTER — TELEPHONE (OUTPATIENT)
Dept: FAMILY MEDICINE CLINIC | Facility: CLINIC | Age: 45
End: 2020-09-21

## 2020-09-21 ENCOUNTER — HOSPITAL ENCOUNTER (OUTPATIENT)
Facility: HOSPITAL | Age: 45
Setting detail: HOSPITAL OUTPATIENT SURGERY
Discharge: HOME | End: 2020-09-21
Attending: SURGERY | Admitting: SURGERY
Payer: COMMERCIAL

## 2020-09-21 ENCOUNTER — ANESTHESIA EVENT (OUTPATIENT)
Dept: SURGERY | Facility: HOSPITAL | Age: 45
End: 2020-09-21
Payer: COMMERCIAL

## 2020-09-21 ENCOUNTER — ANESTHESIA (OUTPATIENT)
Dept: SURGERY | Facility: HOSPITAL | Age: 45
End: 2020-09-21
Payer: COMMERCIAL

## 2020-09-21 VITALS
BODY MASS INDEX: 37.81 KG/M2 | WEIGHT: 279.13 LBS | HEART RATE: 71 BPM | HEIGHT: 72 IN | OXYGEN SATURATION: 94 % | DIASTOLIC BLOOD PRESSURE: 93 MMHG | RESPIRATION RATE: 20 BRPM | SYSTOLIC BLOOD PRESSURE: 141 MMHG | TEMPERATURE: 97 F

## 2020-09-21 DIAGNOSIS — L73.2 HIDRADENITIS: ICD-10-CM

## 2020-09-21 DIAGNOSIS — Z01.818 PRE-OP TESTING: Primary | ICD-10-CM

## 2020-09-21 PROCEDURE — 0HBJXZZ EXCISION OF LEFT UPPER LEG SKIN, EXTERNAL APPROACH: ICD-10-PCS | Performed by: SURGERY

## 2020-09-21 PROCEDURE — 88305 TISSUE EXAM BY PATHOLOGIST: CPT | Performed by: SURGERY

## 2020-09-21 RX ORDER — MEPERIDINE HYDROCHLORIDE 25 MG/ML
12.5 INJECTION INTRAMUSCULAR; INTRAVENOUS; SUBCUTANEOUS AS NEEDED
Status: DISCONTINUED | OUTPATIENT
Start: 2020-09-21 | End: 2020-09-21

## 2020-09-21 RX ORDER — LIDOCAINE HYDROCHLORIDE AND EPINEPHRINE 10; 10 MG/ML; UG/ML
INJECTION, SOLUTION INFILTRATION; PERINEURAL AS NEEDED
Status: DISCONTINUED | OUTPATIENT
Start: 2020-09-21 | End: 2020-09-21 | Stop reason: HOSPADM

## 2020-09-21 RX ORDER — ONDANSETRON 2 MG/ML
4 INJECTION INTRAMUSCULAR; INTRAVENOUS AS NEEDED
Status: DISCONTINUED | OUTPATIENT
Start: 2020-09-21 | End: 2020-09-21

## 2020-09-21 RX ORDER — NALOXONE HYDROCHLORIDE 0.4 MG/ML
80 INJECTION, SOLUTION INTRAMUSCULAR; INTRAVENOUS; SUBCUTANEOUS AS NEEDED
Status: DISCONTINUED | OUTPATIENT
Start: 2020-09-21 | End: 2020-09-21

## 2020-09-21 RX ORDER — ONDANSETRON 2 MG/ML
INJECTION INTRAMUSCULAR; INTRAVENOUS AS NEEDED
Status: DISCONTINUED | OUTPATIENT
Start: 2020-09-21 | End: 2020-09-21 | Stop reason: SURG

## 2020-09-21 RX ORDER — CEPHALEXIN 500 MG/1
500 CAPSULE ORAL 2 TIMES DAILY
Qty: 20 CAPSULE | Refills: 0 | Status: SHIPPED | OUTPATIENT
Start: 2020-09-21 | End: 2020-09-21

## 2020-09-21 RX ORDER — LABETALOL HYDROCHLORIDE 5 MG/ML
5 INJECTION, SOLUTION INTRAVENOUS EVERY 5 MIN PRN
Status: DISCONTINUED | OUTPATIENT
Start: 2020-09-21 | End: 2020-09-21

## 2020-09-21 RX ORDER — BUPIVACAINE HYDROCHLORIDE 5 MG/ML
INJECTION, SOLUTION EPIDURAL; INTRACAUDAL AS NEEDED
Status: DISCONTINUED | OUTPATIENT
Start: 2020-09-21 | End: 2020-09-21 | Stop reason: HOSPADM

## 2020-09-21 RX ORDER — HYDROCODONE BITARTRATE AND ACETAMINOPHEN 5; 325 MG/1; MG/1
1 TABLET ORAL AS NEEDED
Status: DISCONTINUED | OUTPATIENT
Start: 2020-09-21 | End: 2020-09-21

## 2020-09-21 RX ORDER — CEPHALEXIN 500 MG/1
500 CAPSULE ORAL 2 TIMES DAILY
Qty: 20 CAPSULE | Refills: 0 | Status: SHIPPED | OUTPATIENT
Start: 2020-09-21 | End: 2020-10-01

## 2020-09-21 RX ORDER — HYDROCODONE BITARTRATE AND ACETAMINOPHEN 5; 325 MG/1; MG/1
1-2 TABLET ORAL EVERY 4 HOURS PRN
Qty: 6 TABLET | Refills: 0 | Status: SHIPPED | OUTPATIENT
Start: 2020-09-21 | End: 2020-10-27

## 2020-09-21 RX ORDER — METOCLOPRAMIDE HYDROCHLORIDE 5 MG/ML
INJECTION INTRAMUSCULAR; INTRAVENOUS AS NEEDED
Status: DISCONTINUED | OUTPATIENT
Start: 2020-09-21 | End: 2020-09-21 | Stop reason: SURG

## 2020-09-21 RX ORDER — HYDROCODONE BITARTRATE AND ACETAMINOPHEN 5; 325 MG/1; MG/1
2 TABLET ORAL AS NEEDED
Status: DISCONTINUED | OUTPATIENT
Start: 2020-09-21 | End: 2020-09-21

## 2020-09-21 RX ORDER — ACETAMINOPHEN 500 MG
1000 TABLET ORAL ONCE
COMMUNITY
End: 2020-10-27

## 2020-09-21 RX ORDER — KETOROLAC TROMETHAMINE 30 MG/ML
INJECTION, SOLUTION INTRAMUSCULAR; INTRAVENOUS AS NEEDED
Status: DISCONTINUED | OUTPATIENT
Start: 2020-09-21 | End: 2020-09-21 | Stop reason: SURG

## 2020-09-21 RX ORDER — MIDAZOLAM HYDROCHLORIDE 1 MG/ML
1 INJECTION INTRAMUSCULAR; INTRAVENOUS EVERY 5 MIN PRN
Status: DISCONTINUED | OUTPATIENT
Start: 2020-09-21 | End: 2020-09-21

## 2020-09-21 RX ORDER — SODIUM CHLORIDE, SODIUM LACTATE, POTASSIUM CHLORIDE, CALCIUM CHLORIDE 600; 310; 30; 20 MG/100ML; MG/100ML; MG/100ML; MG/100ML
INJECTION, SOLUTION INTRAVENOUS CONTINUOUS
Status: DISCONTINUED | OUTPATIENT
Start: 2020-09-21 | End: 2020-09-21

## 2020-09-21 RX ORDER — LIDOCAINE HYDROCHLORIDE 10 MG/ML
INJECTION, SOLUTION EPIDURAL; INFILTRATION; INTRACAUDAL; PERINEURAL AS NEEDED
Status: DISCONTINUED | OUTPATIENT
Start: 2020-09-21 | End: 2020-09-21 | Stop reason: SURG

## 2020-09-21 RX ORDER — HYDROMORPHONE HYDROCHLORIDE 1 MG/ML
0.4 INJECTION, SOLUTION INTRAMUSCULAR; INTRAVENOUS; SUBCUTANEOUS EVERY 5 MIN PRN
Status: DISCONTINUED | OUTPATIENT
Start: 2020-09-21 | End: 2020-09-21

## 2020-09-21 RX ORDER — ACETAMINOPHEN 500 MG
1000 TABLET ORAL ONCE
Status: DISCONTINUED | OUTPATIENT
Start: 2020-09-21 | End: 2020-09-21 | Stop reason: HOSPADM

## 2020-09-21 RX ORDER — HEPARIN SODIUM 5000 [USP'U]/ML
5000 INJECTION, SOLUTION INTRAVENOUS; SUBCUTANEOUS ONCE
Status: COMPLETED | OUTPATIENT
Start: 2020-09-21 | End: 2020-09-21

## 2020-09-21 RX ORDER — DEXAMETHASONE SODIUM PHOSPHATE 4 MG/ML
VIAL (ML) INJECTION AS NEEDED
Status: DISCONTINUED | OUTPATIENT
Start: 2020-09-21 | End: 2020-09-21 | Stop reason: SURG

## 2020-09-21 RX ORDER — CEFAZOLIN SODIUM/WATER 2 G/20 ML
2 SYRINGE (ML) INTRAVENOUS ONCE
Status: COMPLETED | OUTPATIENT
Start: 2020-09-21 | End: 2020-09-21

## 2020-09-21 RX ORDER — EPHEDRINE SULFATE 50 MG/ML
INJECTION, SOLUTION INTRAVENOUS AS NEEDED
Status: DISCONTINUED | OUTPATIENT
Start: 2020-09-21 | End: 2020-09-21 | Stop reason: SURG

## 2020-09-21 RX ADMIN — SODIUM CHLORIDE, SODIUM LACTATE, POTASSIUM CHLORIDE, CALCIUM CHLORIDE: 600; 310; 30; 20 INJECTION, SOLUTION INTRAVENOUS at 09:43:00

## 2020-09-21 RX ADMIN — EPHEDRINE SULFATE 10 MG: 50 INJECTION, SOLUTION INTRAVENOUS at 09:28:00

## 2020-09-21 RX ADMIN — KETOROLAC TROMETHAMINE 30 MG: 30 INJECTION, SOLUTION INTRAMUSCULAR; INTRAVENOUS at 09:38:00

## 2020-09-21 RX ADMIN — METOCLOPRAMIDE HYDROCHLORIDE 10 MG: 5 INJECTION INTRAMUSCULAR; INTRAVENOUS at 09:17:00

## 2020-09-21 RX ADMIN — ONDANSETRON 4 MG: 2 INJECTION INTRAMUSCULAR; INTRAVENOUS at 09:17:00

## 2020-09-21 RX ADMIN — LIDOCAINE HYDROCHLORIDE 50 MG: 10 INJECTION, SOLUTION EPIDURAL; INFILTRATION; INTRACAUDAL; PERINEURAL at 09:14:00

## 2020-09-21 RX ADMIN — DEXAMETHASONE SODIUM PHOSPHATE 4 MG: 4 MG/ML VIAL (ML) INJECTION at 09:17:00

## 2020-09-21 RX ADMIN — CEFAZOLIN SODIUM/WATER 2 G: 2 G/20 ML SYRINGE (ML) INTRAVENOUS at 09:17:00

## 2020-09-21 NOTE — H&P
Patient presents with 5-year history of recurrent infections in the left groin area. Patient states he works for the [employer] and while working he showers 1 time per day.   He states that he gets repeated infections in this area with drainage of blo Yes      Frequency: 1.0 times per week      Types: Cocaine      Comment: pt reports he snorted some cocaine in [facility] while on vacation.         ROS      GENERAL HEALTH: otherwise feels well, no weight loss, no fever or chills  SKIN: denies any unusual ski 145 mmol/L Final   • Potassium 06/01/2020 4.2  3.5 - 5.1 mmol/L Final   • Chloride 06/01/2020 102  98 - 112 mmol/L Final   • CO2 06/01/2020 29.0  21.0 - 32.0 mmol/L Final   • Anion Gap 06/01/2020 5  0 - 18 mmol/L Final   • BUN 06/01/2020 14  7 - 18 mg/dL F HDL cholesterol <40 mg/dL is low and constitutes a coronary heart disease risk factor.  An HDL cholesterol >60 mg/dL is a negative risk factor for coronary heart disease.         • Triglycerides 06/01/2020 87  30 - 149 mg/dL Final     Reference interval for • Immature Granulocyte % 06/01/2020 0.2  % Final         ASSESSMENT   Imp: Hidradenitis left upper medial thigh  PLan: Doxycycline x1 month plus Hibiclens if no improvement then patient will return for excision

## 2020-09-21 NOTE — OPERATIVE REPORT
[facility]    PATIENT'S NAME: [address] [place], [place] [address]NIKA fajardo   ATTENDING PHYSICIAN: Nhan Leija D.O.   OPERATING PHYSICIAN: Nhan Leija D.O.   PATIENT ACCOUNT#:   [de-identified]    LOCATION:  [address] [place] [address] [address]  MEDICAL RECORD #:   [id number]       [id number]
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
## 2020-09-21 NOTE — ANESTHESIA POSTPROCEDURE EVALUATION
[facility]    Dotty Osgood Patient Status:  Hospital Outpatient Surgery   Age/Gender 41year old male MRN [id number]   [facility] SURGERY Attending Chasidy Burch, [address] [facility] Day # 0 PCP Huma Chandler MD       Anesthesia Post-op Note

## 2020-09-21 NOTE — ANESTHESIA PROCEDURE NOTES
Airway  Urgency: elective      General Information and Staff    Patient location during procedure: OR  Anesthesiologist: Benny Mak MD  Resident/CRNA: Domingo Toribio CRNA  Performed: CRNA     Indications and Patient Condition  Indications for airway man

## 2020-09-21 NOTE — TELEPHONE ENCOUNTER
Spoke with the pt and advised of the notes from Dr. Tank Escobedo and he v/u states that he will schedule to get in to see Dr. Mauricio Maria when he is home next

## 2020-09-21 NOTE — TELEPHONE ENCOUNTER
Resrictions related to his HS surgery he'll need to get from Dr. Reena Camacho.      Restrictions related to the hernia that's not been operated on, weight lifting is okay, but has the potential to make the hernia bigger, so if he notices the hernia growing in size

## 2020-09-21 NOTE — TELEPHONE ENCOUNTER
Pt called states he seen  on Friday and they noticed an umbilical hernia. He just had surgery today, he knows he cant be dead lift weights.  He can feel the umbilical hernia pushing though abdominal wall, doesn't hurt He is wondering if weight lighting(b

## 2020-09-29 ENCOUNTER — MED REC SCAN ONLY (OUTPATIENT)
Dept: SURGERY | Facility: CLINIC | Age: 45
End: 2020-09-29

## 2020-10-27 ENCOUNTER — OFFICE VISIT (OUTPATIENT)
Dept: SURGERY | Facility: CLINIC | Age: 45
End: 2020-10-27
Payer: COMMERCIAL

## 2020-10-27 VITALS — WEIGHT: 278 LBS | BODY MASS INDEX: 37.65 KG/M2 | HEIGHT: 72 IN

## 2020-10-27 DIAGNOSIS — K42.9 UMBILICAL HERNIA WITHOUT OBSTRUCTION AND WITHOUT GANGRENE: Primary | ICD-10-CM

## 2020-10-27 PROCEDURE — 99214 OFFICE O/P EST MOD 30 MIN: CPT | Performed by: SURGERY

## 2020-10-27 PROCEDURE — 3008F BODY MASS INDEX DOCD: CPT | Performed by: SURGERY

## 2020-10-27 NOTE — H&P
Alfredo Varma is a 41year old male  Patient presents with:  Hernia: Pt here for umbilical hernia consult. Denies any pain or discomfort. REFERRED BY    Patient presents with *patient presents with umbilical hernia.   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

## 2020-11-13 ENCOUNTER — APPOINTMENT (OUTPATIENT)
Dept: LAB | Age: 45
End: 2020-11-13
Attending: SURGERY
Payer: COMMERCIAL

## 2020-11-13 ENCOUNTER — TELEPHONE (OUTPATIENT)
Dept: SURGERY | Facility: CLINIC | Age: 45
End: 2020-11-13

## 2020-11-13 DIAGNOSIS — K43.9 VENTRAL HERNIA WITHOUT OBSTRUCTION OR GANGRENE: Primary | ICD-10-CM

## 2020-11-13 DIAGNOSIS — K42.9 UMBILICAL HERNIA WITHOUT OBSTRUCTION AND WITHOUT GANGRENE: ICD-10-CM

## 2021-03-08 ENCOUNTER — OFFICE VISIT (OUTPATIENT)
Dept: FAMILY MEDICINE CLINIC | Facility: CLINIC | Age: 46
End: 2021-03-08
Payer: COMMERCIAL

## 2021-03-08 VITALS
TEMPERATURE: 97 F | RESPIRATION RATE: 18 BRPM | WEIGHT: 270 LBS | OXYGEN SATURATION: 98 % | BODY MASS INDEX: 37 KG/M2 | SYSTOLIC BLOOD PRESSURE: 142 MMHG | DIASTOLIC BLOOD PRESSURE: 78 MMHG | HEART RATE: 67 BPM

## 2021-03-08 DIAGNOSIS — L72.9 CYST OF SKIN: Primary | ICD-10-CM

## 2021-03-08 PROCEDURE — 3078F DIAST BP <80 MM HG: CPT | Performed by: FAMILY MEDICINE

## 2021-03-08 PROCEDURE — 3077F SYST BP >= 140 MM HG: CPT | Performed by: FAMILY MEDICINE

## 2021-03-08 PROCEDURE — 99213 OFFICE O/P EST LOW 20 MIN: CPT | Performed by: FAMILY MEDICINE

## 2021-03-08 NOTE — PROGRESS NOTES
Blair Brewster is a 40year old male. HPI:   Patient c/o a \"small boil\" that fluctuates in size for awhile, years he thinks, but came back recently, when he got up from the toilet seat there was blood but seems calmed down again possibly. Never hurts. use years ago         REVIEW OF SYSTEMS:   GENERAL HEALTH: feels well otherwise  See HPI    EXAM:   /78   Pulse 67   Temp 97.1 °F (36.2 °C) (Temporal)   Resp 18   Wt 270 lb (122.5 kg)   SpO2 98%   BMI 36.62 kg/m²   GENERAL: well developed, well thony

## 2021-04-05 ENCOUNTER — IMMUNIZATION (OUTPATIENT)
Dept: LAB | Age: 46
End: 2021-04-05
Attending: HOSPITALIST
Payer: COMMERCIAL

## 2021-04-05 DIAGNOSIS — Z23 NEED FOR VACCINATION: Primary | ICD-10-CM

## 2021-04-05 PROCEDURE — 0001A SARSCOV2 VAC 30MCG/0.3ML IM: CPT

## 2021-04-06 ENCOUNTER — OFFICE VISIT (OUTPATIENT)
Dept: SURGERY | Facility: CLINIC | Age: 46
End: 2021-04-06
Payer: COMMERCIAL

## 2021-04-06 VITALS — WEIGHT: 268 LBS | HEIGHT: 72 IN | BODY MASS INDEX: 36.3 KG/M2

## 2021-04-06 DIAGNOSIS — L05.91 PILONIDAL CYST: Primary | ICD-10-CM

## 2021-04-06 PROCEDURE — 3008F BODY MASS INDEX DOCD: CPT | Performed by: SURGERY

## 2021-04-06 PROCEDURE — 99243 OFF/OP CNSLTJ NEW/EST LOW 30: CPT | Performed by: SURGERY

## 2021-04-06 NOTE — H&P
Blair Brewster is a 40year old male  Patient presents with:  Cyst: c/o cyst that drained on left butt check. Denies any drainage or pain at this time. REFERRED BY    Patient presents with drainage area from left side of shana cleft.   Patient states t [employer]      Social History    Tobacco Use      Smoking status: Former Smoker        Types: Cigars        Quit date: 3/7/2019        Years since quittin.0      Smokeless tobacco: Never Used      Tobacco comment: cigars/cigarettes    Vaping U ROM  Kenia cleft demonstrates a single pilonidal sinus in the midline as well as a drainage 0.2 cm left lateral to the pilonidal sinus  STUDIES:     Appointment on 11/13/2020   Component Date Value Ref Range Status   • SARS-CoV-2 (COVID-19) 11/13/2020 Not No prescriptions requested or ordered in this encounter       Imaging & Consults:  None

## 2021-04-07 ENCOUNTER — TELEPHONE (OUTPATIENT)
Dept: FAMILY MEDICINE CLINIC | Facility: CLINIC | Age: 46
End: 2021-04-07

## 2021-04-07 NOTE — TELEPHONE ENCOUNTER
Patient stated that doctor Radha Nicole told him he needed a colonoscopy. Wife is a nurse and she stated to patient that surgeons don't do colonoscopies. Patient states he doesn't care who does it but wife stated that. Please advise.

## 2021-04-07 NOTE — TELEPHONE ENCOUNTER
Pt called, was told yesterday by another dr that he needs a colonoscopy. Pt would like to discuss.    Please call pt at [phone]

## 2021-04-07 NOTE — TELEPHONE ENCOUNTER
Yes, general surgeons do colonoscopies, depending on the doc it's often a big part of their practice

## 2021-05-01 ENCOUNTER — IMMUNIZATION (OUTPATIENT)
Dept: LAB | Age: 46
End: 2021-05-01
Attending: HOSPITALIST
Payer: COMMERCIAL

## 2021-05-01 DIAGNOSIS — Z23 NEED FOR VACCINATION: Primary | ICD-10-CM

## 2021-05-01 PROCEDURE — 0002A SARSCOV2 VAC 30MCG/0.3ML IM: CPT

## 2021-05-13 ENCOUNTER — TELEPHONE (OUTPATIENT)
Dept: FAMILY MEDICINE CLINIC | Facility: CLINIC | Age: 46
End: 2021-05-13

## 2021-05-13 NOTE — TELEPHONE ENCOUNTER
Patient advised that the office can print off a copy of his immunizations. Patient asked for the copy to be mailed to him. Mailed to: Yvonne Valle.   Honey Gutierrez

## 2021-05-13 NOTE — TELEPHONE ENCOUNTER
Pt called he is wanting to know how he can get copy of his covid vaccines?  He doesn't know where he placed his card but was advised he could print it off of [employer]

## 2021-06-10 ENCOUNTER — TELEPHONE (OUTPATIENT)
Dept: SURGERY | Facility: CLINIC | Age: 46
End: 2021-06-10

## 2021-06-10 NOTE — TELEPHONE ENCOUNTER
Relayed surgery checklist and prep instruction with patient for colonoscopy on 6/30/21. Sent pt all instructions including prep via [employer] per pt request. No further questions or concerns by pt at this time.

## 2021-06-16 ENCOUNTER — TELEPHONE (OUTPATIENT)
Dept: SURGERY | Facility: CLINIC | Age: 46
End: 2021-06-16

## 2021-06-16 NOTE — TELEPHONE ENCOUNTER
Pt called to CX Colonoscopy on 6-30 at [facility], his work schedule currently is too busy, he will c/b in the fall to reschedule

## 2021-07-15 ENCOUNTER — TELEPHONE (OUTPATIENT)
Dept: SURGERY | Facility: CLINIC | Age: 46
End: 2021-07-15

## 2021-07-15 NOTE — TELEPHONE ENCOUNTER
Pt called to CX Surgery on 8-27 at [facility] - Pilonidal cystectomy - [place]

## 2023-01-04 ENCOUNTER — APPOINTMENT (OUTPATIENT)
Dept: URBAN - METROPOLITAN AREA CLINIC 272 | Age: 48
Setting detail: DERMATOLOGY
End: 2023-01-04

## 2023-01-04 DIAGNOSIS — R20.2 PARESTHESIA OF SKIN: ICD-10-CM

## 2023-01-04 DIAGNOSIS — L91.0 HYPERTROPHIC SCAR: ICD-10-CM

## 2023-01-04 PROCEDURE — OTHER DEFER: OTHER

## 2023-01-04 PROCEDURE — OTHER COUNSELING: OTHER

## 2023-01-04 PROCEDURE — OTHER MIPS QUALITY: OTHER

## 2023-01-04 PROCEDURE — OTHER TREATMENT REGIMEN: OTHER

## 2023-01-04 PROCEDURE — 99203 OFFICE O/P NEW LOW 30 MIN: CPT

## 2023-01-04 ASSESSMENT — LOCATION ZONE DERM: LOCATION ZONE: TRUNK

## 2023-01-04 ASSESSMENT — LOCATION SIMPLE DESCRIPTION DERM: LOCATION SIMPLE: GROIN

## 2023-01-04 ASSESSMENT — LOCATION DETAILED DESCRIPTION DERM: LOCATION DETAILED: SUPRAPUBIC SKIN

## 2023-01-04 NOTE — HPI: SKIN LESION
What Type Of Note Output Would You Prefer (Optional)?: Bullet Format
How Severe Is Your Skin Lesion?: mild
Has Your Skin Lesion Been Treated?: not been treated
Is This A New Presentation, Or A Follow-Up?: Skin Lesions
Additional History: Patient believes he’s got ingrown hairs in his genital area. Would like evaluated and treated.

## 2023-01-04 NOTE — PROCEDURE: TREATMENT REGIMEN
Plan: Discussed with patient doing kenalog injections, patient. Recommended using guards when shaving genital area.
Detail Level: Zone

## 2023-09-18 ENCOUNTER — APPOINTMENT (OUTPATIENT)
Dept: URBAN - METROPOLITAN AREA CLINIC 305 | Age: 48
Setting detail: DERMATOLOGY
End: 2023-09-18

## 2023-09-18 DIAGNOSIS — L73.2 HIDRADENITIS SUPPURATIVA: ICD-10-CM

## 2023-09-18 DIAGNOSIS — R22.9 LOCALIZED SWELLING, MASS AND LUMP, UNSPECIFIED: ICD-10-CM

## 2023-09-18 PROCEDURE — 99214 OFFICE O/P EST MOD 30 MIN: CPT

## 2023-09-18 PROCEDURE — OTHER COUNSELING: OTHER

## 2023-09-18 PROCEDURE — OTHER PRESCRIPTION: OTHER

## 2023-09-18 PROCEDURE — OTHER TREATMENT REGIMEN: OTHER

## 2023-09-18 PROCEDURE — OTHER MIPS QUALITY: OTHER

## 2023-09-18 RX ORDER — CLINDAMYCIN PHOSPHATE 10 MG/ML
LOTION TOPICAL QD
Qty: 60 | Refills: 3 | Status: ERX | COMMUNITY
Start: 2023-09-18

## 2023-09-18 RX ORDER — DOXYCYCLINE HYCLATE 100 MG/1
CAPSULE, GELATIN COATED ORAL
Qty: 60 | Refills: 2 | Status: ERX | COMMUNITY
Start: 2023-09-18

## 2023-09-18 ASSESSMENT — LOCATION SIMPLE DESCRIPTION DERM
LOCATION SIMPLE: LEFT THIGH
LOCATION SIMPLE: RIGHT THIGH

## 2023-09-18 ASSESSMENT — LOCATION ZONE DERM: LOCATION ZONE: LEG

## 2023-09-18 ASSESSMENT — LOCATION DETAILED DESCRIPTION DERM
LOCATION DETAILED: LEFT ANTERIOR MEDIAL PROXIMAL THIGH
LOCATION DETAILED: LEFT ANTERIOR PROXIMAL THIGH
LOCATION DETAILED: RIGHT ANTERIOR MEDIAL PROXIMAL THIGH

## 2023-09-18 NOTE — PROCEDURE: TREATMENT REGIMEN
Plan: Per pt history, he had an HS lesion removed in the past by excision. Discussed that by cutting one place out, it will not be curative and still requires chronic medical therapy to prevent recurrence. Discussed that we could try topical and oral first. Will start doxycycline, clindamycin, and hibaclense. \\n\\nWill consider humira if not well controlled in the future. \\nAdvised we could always send to general surgery for removal.
Detail Level: Zone

## 2023-09-18 NOTE — HPI: RASH
What Type Of Note Output Would You Prefer (Optional)?: Bullet Format
Is The Patient Presenting As Previously Scheduled?: Yes
How Severe Is Your Rash?: mild
Is This A New Presentation, Or A Follow-Up?: Rash
Additional History: Pt stated he has HS.

## 2024-03-04 ENCOUNTER — TELEPHONE (OUTPATIENT)
Dept: FAMILY MEDICINE CLINIC | Facility: CLINIC | Age: 49
End: 2024-03-04

## 2024-05-09 ENCOUNTER — PATIENT OUTREACH (OUTPATIENT)
Dept: FAMILY MEDICINE CLINIC | Facility: CLINIC | Age: 49
End: 2024-05-09

## 2024-06-14 ENCOUNTER — TELEPHONE (OUTPATIENT)
Dept: FAMILY MEDICINE CLINIC | Facility: CLINIC | Age: 49
End: 2024-06-14

## 2024-06-17 ENCOUNTER — PATIENT OUTREACH (OUTPATIENT)
Dept: CASE MANAGEMENT | Age: 49
End: 2024-06-17

## 2024-06-17 NOTE — PROCEDURES
The office order for PCP removal request is Approved and finalized on June 17, 2024.    Thanks,  [facility]

## (undated) DEVICE — KENDALL SCD EXPRESS SLEEVES, KNEE LENGTH, MEDIUM: Brand: KENDALL SCD

## (undated) DEVICE — REM POLYHESIVE ADULT PATIENT RETURN ELECTRODE: Brand: VALLEYLAB

## (undated) DEVICE — STERILE SYNTHETIC POLYISOPRENE POWDER-FREE SURGICAL GLOVES WITH HYDROGEL COATING: Brand: PROTEXIS

## (undated) DEVICE — CHLORAPREP 26ML APPLICATOR

## (undated) DEVICE — GYN CDS: Brand: MEDLINE INDUSTRIES, INC.

## (undated) DEVICE — SUTURE ETHILON 4-0 FS-1

## (undated) DEVICE — SOL  .9 1000ML BTL

## (undated) NOTE — Clinical Note
Devan Cornejo saw Mary Jean in the office. He presents with hidradenitis of his left upper medial thigh. I am recommending 1 month of chronic antibiotics. If this does not improve the situation then I am going to recommend excision of the hidradenitis.   Thank

## (undated) NOTE — Clinical Note
Olya Puckett saw Farheen Gallardo in the office today. He presents with a small umbilical hernia. I am recommending umbilical herniorrhaphy. He is currently scheduled in the next few weeks.   Thank you [facility]

## (undated) NOTE — Clinical Note
Gayle Lombardo saw Alkajacquie Talbot in the office today. He presents with a recurrent pilonidal cyst at the shana cleft area. This has been present on and off for the past 3 years. Am recommending excision. Patient will call back to schedule.   Thank you Caitlin Katz